# Patient Record
Sex: FEMALE | Race: WHITE | Employment: PART TIME | URBAN - METROPOLITAN AREA
[De-identification: names, ages, dates, MRNs, and addresses within clinical notes are randomized per-mention and may not be internally consistent; named-entity substitution may affect disease eponyms.]

---
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
## 2022-01-11 DIAGNOSIS — F41.1 GAD (GENERALIZED ANXIETY DISORDER): ICD-10-CM

## 2022-01-11 DIAGNOSIS — F39 MOOD DISORDER (H): ICD-10-CM

## 2022-01-12 NOTE — TELEPHONE ENCOUNTER
Medication requested: sertraline (ZOLOFT) 50 MG tablet  Last refilled: 9/12/21  Qty: 30/4      Last seen: 8/12/21  RTC: RTC upon return from college for Winter break. Meanwhile, establish care with on-campus psychiatrist  Cancel: 0  No-show: 0  Next appt: 0    Refill decision:   Refill pended and routed to the provider for review/determination due to   Unclear if pt has found a new psychiatrist on campus

## 2022-03-16 ENCOUNTER — TELEPHONE (OUTPATIENT)
Dept: PSYCHIATRY | Facility: CLINIC | Age: 22
End: 2022-03-16
Payer: COMMERCIAL

## 2022-03-16 DIAGNOSIS — F39 MOOD DISORDER (H): ICD-10-CM

## 2022-03-16 DIAGNOSIS — F41.1 GAD (GENERALIZED ANXIETY DISORDER): ICD-10-CM

## 2022-03-16 NOTE — TELEPHONE ENCOUNTER
[facility] Call Center    Phone Message    May a detailed message be left on voicemail: yes     Reason for Call: Medication Refill Request    Has the patient contacted the pharmacy for the refill? Yes   Name of medication being requested: lamoTRIgine (LAMICTAL) 100 MG tablet & sertraline (ZOLOFT) 50 MG tablet  Provider who prescribed the medication: Dr. Rand  Pharmacy: [id number] - [place], [place] - [address] AT [facility] [address] (Ph: [phone])  Date medication is needed: ASAP: 50mg of lamotrigine left and a couple of days of sertraline      Action Taken: Message routed to:  Other: P MIDB NURSE POOL    Travel Screening: Not Applicable

## 2022-03-16 NOTE — TELEPHONE ENCOUNTER
Last seen: 8/12/21  RTC: RTC upon return from [facility] for winter break  Cancel: none  No-show: none  Next appt: 5/13/22     Incoming refill from patient via telephone      Medication requested: sertraline (ZOLOFT) 50 MG tablet  Directions: Take 1 tablet (50 mg) by mouth daily  Qty: 30 tablet   Last refilled: 2/15/22     Medication requested: lamoTRIgine (LAMICTAL) 100 MG tablet  Directions: Take 1.5 tablets (150 mg) by mouth daily - Oral  Qty: 45 tablet   Last refilled: 12/31/21, should be one refill remaining      Reached out to patient via [employer] to confirm when if lamotrigine has been being taken consistently.

## 2022-03-17 RX ORDER — LAMOTRIGINE 100 MG/1
150 TABLET ORAL DAILY
Qty: 45 TABLET | Refills: 1 | Status: SHIPPED | OUTPATIENT
Start: 2022-03-17 | End: 2022-05-06

## 2022-03-17 NOTE — TELEPHONE ENCOUNTER
Placed call to patient who confirmed she has been taking medications consistently. Reports she has excess supply of lamotrigine which accounts for the lapse in refills.     Received TORB from provider to send enough refills until next appointment.

## 2022-05-06 ENCOUNTER — VIRTUAL VISIT (OUTPATIENT)
Dept: PSYCHIATRY | Facility: CLINIC | Age: 22
End: 2022-05-06
Payer: COMMERCIAL

## 2022-05-06 DIAGNOSIS — F41.1 GAD (GENERALIZED ANXIETY DISORDER): ICD-10-CM

## 2022-05-06 DIAGNOSIS — F39 MOOD DISORDER (H): ICD-10-CM

## 2022-05-06 PROCEDURE — 99214 OFFICE O/P EST MOD 30 MIN: CPT | Mod: GT | Performed by: PSYCHIATRY & NEUROLOGY

## 2022-05-06 RX ORDER — LAMOTRIGINE 100 MG/1
150 TABLET ORAL DAILY
Qty: 45 TABLET | Refills: 3 | Status: SHIPPED | OUTPATIENT
Start: 2022-05-06 | End: 2022-08-19

## 2022-05-06 NOTE — PROGRESS NOTES
"  Progress Note    Identification: Keri is a 21 year old woman with a history of persistent depressive disorder (versus bipolar 2). She was last seen in February at which time we made no medication changes.    Recent History:     Keri reports she has been feeling better since starting zoloft. Anxiety is more manageable. Feel overall less anxious, better control. Feels more motivated.   Goes for a walk in the morning, feels this helps her set up for the day ahead. More things to look forward to. No suicidal thoughts lately.     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 Planning to go to grad school in psychology after college.    Delaney reports she sometimes experiences \"sudden bursts of really intense energy\". She feels it is not iker, but feels out of control. Really really hyper, sudden and drops off quickly. Returns to base level but not depression. Lasts a few minutes. Feels happier and more social during these energy bursts. Typically during these experiences she will \"ramble\" at her roommate and speak faster than usual.    Medications:  Lamotrigine 150mg/day  Zoloft 50mg/day  Birth control pill    Mental Status Examination: Patient was pleasant, calm and cooperative. Normal speech. Affect is neutral to bright. Mood is reported as \"good\". Thought process is linear. Thought content without SI or HI currently, but did have SI a couple weeks ago. No evidence of psychosis. Insight and judgment are intact. Concentration and memory within normal limits. Intellectual functioning appears to be above average.    Diagnosis:  1. Persistent depressive disorder (rule out bipolar 2)  2. Generalized anxiety disorder with panic attacks  3. Rule out eating disorder  4. Rule out body dysmorphic disorder     Assessment: " Keri is a 21 year old woman with history of depression (with prior provider who started lamictal diagnosing Bipolar 2) and anxiety symptoms since childhood, worsening during middle school and high school. Recently completed DBT therapy. Stressors include academic stress, COVID-related isolation, and ongoing poor sense of self. WE recently added zoloft which has been very helpful for anxiety and depression symptoms. She does have some episodes of increased energy which may be a part of being on the bipolar spectrum but they are very transient (few minutes) and not impairing. Will continue for now.    Plan:   1. Continue current medications.  2. RTC prior to return to college    Maria Teresa Rand MD    30 minutes spent in face to face time with patient for this visit, more than half in counseling on management of mood and eating symptoms and academic issues.    Video- Visit Details  Type of service:  video visit for medication management  Time of service:    Date:  05/06/2022    Video Start Time:  2:3PM        Video End Time:  3PM    Reason for video visit:  Patient unable to travel due to Covid-19  Originating Site (patient location):  Home  Distant Site (provider location): [facility]  Mode of Communication:  Video Conference via [employer]  Consent:  Patient has given verbal consent for video visit?: Yes

## 2022-08-19 ENCOUNTER — OFFICE VISIT (OUTPATIENT)
Dept: PSYCHIATRY | Facility: CLINIC | Age: 22
End: 2022-08-19
Payer: COMMERCIAL

## 2022-08-19 VITALS
WEIGHT: 218.1 LBS | BODY MASS INDEX: 36.78 KG/M2 | HEART RATE: 98 BPM | SYSTOLIC BLOOD PRESSURE: 137 MMHG | DIASTOLIC BLOOD PRESSURE: 81 MMHG

## 2022-08-19 DIAGNOSIS — F39 MOOD DISORDER (H): ICD-10-CM

## 2022-08-19 DIAGNOSIS — F41.1 GAD (GENERALIZED ANXIETY DISORDER): ICD-10-CM

## 2022-08-19 PROCEDURE — 99215 OFFICE O/P EST HI 40 MIN: CPT | Performed by: PSYCHIATRY & NEUROLOGY

## 2022-08-19 RX ORDER — LAMOTRIGINE 100 MG/1
150 TABLET ORAL DAILY
Qty: 45 TABLET | Refills: 11 | Status: SHIPPED | OUTPATIENT
Start: 2022-08-19 | End: 2023-11-17

## 2022-08-19 NOTE — NURSING NOTE
Chief Complaint   Patient presents with     Recheck Medication       /81 (BP Location: Right arm, Patient Position: Sitting, Cuff Size: Adult Regular)   Pulse 98   Wt 98.9 kg (218 lb 1.6 oz)   BMI 36.78 kg/m      Medina Marcus  August 19, 2022

## 2022-08-19 NOTE — PATIENT INSTRUCTIONS
**For crisis resources, please see the information at the end of this document**   Patient Education    Thank you for coming to the [facility].    Lab Testing:  If you had lab testing today and your results are reassuring or normal they will be mailed to you or sent through [employer] within 7 days. If the lab tests need quick action we will call you with the results. The phone number we will call with results is # [phone] (home) . If this is not the best number please call our clinic and change the number.    Medication Refills:  If you need any refills please call your pharmacy and they will contact us. Our fax number for refills is [phone]. Please allow three business for refill processing. If you need to  your refill at a new pharmacy, please contact the new pharmacy directly. The new pharmacy will help you get your medications transferred.     Scheduling:  If you have any concerns about today's visit or wish to schedule another appointment please call our office during normal business hours [phone] (8-5:00 M-F)    Contact Us:  Please call [phone] during business hours (8-5:00 M-F).  If after clinic hours, or on the weekend, please call  [phone].    Financial Assistance [phone]  [facility] Billing [phone]  Central Billing Office, [facility]: [phone]  [place] Billing [phone]  Medical Records [phone]  [place] Patient Bill of Rights [email or web address]       MENTAL HEALTH CRISIS NUMBERS:  For a medical emergency please call  911 or go to the nearest ER.     [facility]:   [facility] -[phone]   Crisis Residence [facility]idence -[phone]   Walk-In Counseling Center [facility] [place] [facility] -[phone]   COPE 24/7 [facility] Mobile Team -[phone] (adults)/[phone] (child)  CHILD: [facility] needs assessment team - [phone]       [facility]:   [facility] - [phone]   Walk-in counseling [facility] - [phone]   Walk-in counseling [facility] - [phone]   Crisis Residence [facility] - [phone]  Urgent Care Adult Mental Ivlhfo-[phone] mobile unit/ 24/7 crisis line    National Crisis Numbers:   National Suicide Prevention Lifeline: 6-226-004-TALK (702-344-5337)  Poison Control Center - 1-748-724-7315  [employer]/resources for a list of additional resources (SOS)  Trans Lifeline a hotline for transgender people 2-072-808-2493  The [employer] Project a hotline for LGBT youth 1-937.112.2441  Crisis Text Line: For any crisis 24/7   To: 452882  see www.crisistextline.org  - IF MAKING A CALL FEELS TOO HARD, send a text!         Again thank you for choosing [facility] and please let us know how we can best partner with you to improve you and your family's health.    You may be receiving a survey regarding this appointment. We would love to have your feedback, both positive and negative. The survey is done by an external company, so your answers are anonymous.
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
## 2022-09-11 ENCOUNTER — HEALTH MAINTENANCE LETTER (OUTPATIENT)
Age: 22
End: 2022-09-11

## 2023-09-18 DIAGNOSIS — F39 MOOD DISORDER (H): ICD-10-CM

## 2023-09-18 DIAGNOSIS — F41.1 GAD (GENERALIZED ANXIETY DISORDER): ICD-10-CM

## 2023-09-18 RX ORDER — LAMOTRIGINE 100 MG/1
150 TABLET ORAL DAILY
Qty: 45 TABLET | Refills: 0 | Status: CANCELLED | OUTPATIENT
Start: 2023-09-18

## 2023-09-18 NOTE — TELEPHONE ENCOUNTER
"Refill request received from: pharmacy    Last appointment: 8/19/2022    RTC: winter break    Canceled appointments: 0    No Showed appointments: 0    Follow up scheduled: 0    Requested medication(s) (copy and paste last order information):     Disp Refills Start End RAGINI    sertraline (ZOLOFT) 50 MG tablet 30 tablet 11 8/19/2022  No   Sig - Route: Take 1 tablet (50 mg) by mouth daily - Oral   Sent to pharmacy as: Sertraline HCl 50 MG Oral Tablet (ZOLOFT)   Class: E-Prescribe   Order: [id number]   E-Prescribing Status: Receipt confirmed by pharmacy (8/19/2022  4:59 PM CDT)       Disp Refills Start End RAGINI    lamoTRIgine (LAMICTAL) 100 MG tablet 45 tablet 11 8/19/2022  No   Sig - Route: Take 1.5 tablets (150 mg) by mouth daily - Oral   Sent to pharmacy as: lamoTRIgine 100 MG Oral Tablet (LaMICtal)   Class: E-Prescribe   Order: [id number]   E-Prescribing Status: Receipt confirmed by pharmacy (8/19/2022  4:59 PM CDT)          Date medication last filled per outside med information: 7/25/2023 for 30 d/s    Months of medication pended per MIDB refill protocol: 1    Request was sent to RNCC Pool for approval    If patient is due for follow up \"Appointment required for further refills [phone]\" was placed in the sig of the medication and encounter was routed to scheduling pool to encourage follow up.     Medication pended by: Mariajose Berg CMA    "

## 2023-09-18 NOTE — TELEPHONE ENCOUNTER
Reached out to patient via [employer] to confirm when each medication was last taken and to notify that an appointment is required for further refills.

## 2023-09-20 NOTE — TELEPHONE ENCOUNTER
Called patient and scheduled as new patient on 11/17 as they have not been seen in over a year. Patient stated that the last time she took the medication was on 9/17/2023. I encouraged the patient to look into their mychart to respond to the care team's message. I scheduled the patient in 3 return slots as they are only in [place] 11/17-11/25. A message to the provider was also sent to confirm scheduling.

## 2023-09-22 NOTE — TELEPHONE ENCOUNTER
Maria Teresa Rand MD  to [facility]    9/22/23  3:58 PM  Hi Delia, we can re-start at 25mg/day for 2 weeks, then 50mg/day for 2 weeks, then 100mg/day. Maybe she can stay there until our next visit.   Thank you!        Follow up:   Placed call to patient to discuss titration plan before sending prescription. No answer. LVM requesting call back before script is sent.

## 2023-09-25 RX ORDER — LAMOTRIGINE 25 MG/1
TABLET ORAL
Qty: 42 TABLET | Refills: 0 | Status: SHIPPED | OUTPATIENT
Start: 2023-09-25 | End: 2024-04-19

## 2023-09-25 RX ORDER — LAMOTRIGINE 100 MG/1
100 TABLET ORAL DAILY
Qty: 30 TABLET | Refills: 0 | Status: SHIPPED | OUTPATIENT
Start: 2023-10-20 | End: 2024-04-19

## 2023-09-25 NOTE — TELEPHONE ENCOUNTER
Incoming call from patient. Writer explained titration plan for lamotrigine, patient verbalized understanding.     Received TORB from provider to send refills (and refills of sertraline 50 mg) to match upcoming appointment date.

## 2023-11-07 DIAGNOSIS — F39 MOOD DISORDER (H): ICD-10-CM

## 2023-11-07 RX ORDER — LAMOTRIGINE 100 MG/1
100 TABLET ORAL DAILY
Qty: 30 TABLET | Refills: 0 | OUTPATIENT
Start: 2023-11-07

## 2023-11-07 NOTE — TELEPHONE ENCOUNTER
Per chart review, refill filled on for this medication but patient was not due to start 100mg dose until 10/20, so patient should have enough medication to get to follow-up scheduled on 11/17..  Called patient LVM in case refill history is incorrect and patient will run out of medication prior to next appointment.

## 2023-11-07 NOTE — TELEPHONE ENCOUNTER
"Refill request received from: Pharm     Last appointment: 08/19/22    RTC: winter break     Canceled appointments: None    No Showed appointments: None    Follow up scheduled: 11/17    Requested medication(s) (copy and paste last order information):   Disp Refills Start End RAGINI    lamoTRIgine (LAMICTAL) 100 MG tablet 30 tablet 0 10/20/2023  --   Sig - Route: Take 1 tablet (100 mg) by mouth daily (to be started after completion of 50 mg daily for two weeks) - Oral   Sent to pharmacy as: lamoTRIgine 100 MG Oral Tablet (LaMICtal)   Class: E-Prescribe   Order: [id number]   E-Prescribing Status: Receipt confirmed by pharmacy (9/25/2023  3:52 PM CDT)       Date medication last filled per outside med information: 09/28     Amount medication last filled for (d/s  or quantity): 30 d/s    Months of medication pended per MIDB refill protocol: 1 month     Request was sent to Maria Teresa Rand for approval    If patient is due for follow up \"Appointment required for further refills [phone]\" was placed in the sig of the medication and encounter was routed to scheduling pool to encourage follow up.     "

## 2023-11-17 ENCOUNTER — VIRTUAL VISIT (OUTPATIENT)
Dept: PSYCHIATRY | Facility: CLINIC | Age: 23
End: 2023-11-17
Payer: COMMERCIAL

## 2023-11-17 DIAGNOSIS — F39 MOOD DISORDER (H): ICD-10-CM

## 2023-11-17 PROCEDURE — 99214 OFFICE O/P EST MOD 30 MIN: CPT | Mod: VID | Performed by: PSYCHIATRY & NEUROLOGY

## 2023-11-17 PROCEDURE — 90833 PSYTX W PT W E/M 30 MIN: CPT | Mod: VID | Performed by: PSYCHIATRY & NEUROLOGY

## 2023-11-17 RX ORDER — LAMOTRIGINE 100 MG/1
150 TABLET ORAL DAILY
Qty: 45 TABLET | Refills: 6 | Status: SHIPPED | OUTPATIENT
Start: 2023-11-17 | End: 2024-07-29

## 2023-11-17 ASSESSMENT — PAIN SCALES - GENERAL: PAINLEVEL: NO PAIN (0)

## 2023-11-17 NOTE — NURSING NOTE
Is the patient currently in the state of [place]? YES    Visit mode:VIDEO    If the visit is dropped, the patient can be reconnected by: VIDEO VISIT: Send to e-mail at: [email or web address]    Will anyone else be joining the visit? NO  (If patient encounters technical issues they should call [phone]    How would you like to obtain your AVS? MyChart    Are changes needed to the allergy or medication list? No  Patient denies any changes since echeck-in regarding medication and allergies and states all information entered during echeck-in remains accurate.     Reason for visit: Consult    Jo KING

## 2023-11-17 NOTE — PROGRESS NOTES
"Virtual Visit Details    Type of service:  Video Visit     Originating Location (pt. Location): Home    Distant Location (provider location):  On-site  Platform used for Video Visit: [employer]      Progress Note    Identification: Keri is a 23 year old woman with a history of persistent depressive disorder (versus bipolar 2). She was last seen in August 2022 at which time we made no medication changes.    Recent History: Due to a lapse in visits (not seen for over a year), Keri had a hard time getting her medications refilled. She had a lapse in her lamotrigine in September, and had to re-start at 25mg/day and taper back up per usual protocol. Now back on previous dose of 150mg/day.    Has noticed hypmania more, especially when she forgets her medications. Also notices depression more when not taking meds.  Struggles to remember medications regularly. This comes and goes, like when she is more stressed with school, more likely to forget. Sometimes will forget 1-2 days in a row. Things are better when she remembers to take her medications. Sometimes forgets to eat and then can't take medications.    \"When I take the medication consistently every single day I feel good, I feel regulated.\"    Keri described her hypomania episodes as follows: excitable, fast talking, energetic, happy \"going for it\", distracted easily. Usually a day or two. Generally experiences a dip in mood afterwards. Generally has better self esteem during these episodes, but no grandiosity.    Keri described her depression episodes as follows: crying, super low energy, very negative on self. No recent suicidal ideation (only occasional thoughts of \"I don't want to feel this way\").  Occasional self harm thoughts but they are easily overcome. Usually last 1-4 weeks (more commonly closer to a week). Most recently late August / early September.    Sleep has been erratic. Procrastinates on sleep, stays up late doing homework.    Keri reports she will " "be graduating from school this December. Excited to come home.   Planning to intern at a hospital in [place] on a psych jasso. Planning to take next semester and next academic year off, then pursue a graduate degree in psychology.      Medications:  Lamotrigine 150mg/day  Zoloft 50mg/day  Birth control pill    Mental Status Examination: Patient was pleasant, calm and cooperative. Normal speech. Affect is neutral to bright. Mood is reported as \"good\". Thought process is linear. Thought content without SI or HI currently. No evidence of psychosis. Insight and judgment are intact. Concentration and memory within normal limits. Intellectual functioning appears to be above average.    Diagnosis:  1. Persistent depressive disorder (rule out bipolar 2)  2. Generalized anxiety disorder with panic attacks  3. Rule out eating disorder  4. Rule out body dysmorphic disorder     Assessment: Keri is a 21 year old woman with history of depression and anxiety symptoms since childhood, worsening during middle school and high school. Completed DBT therapy last year. She did have a diagnosis of Bipolar 2 during high school that was later questioned because of lack of a clear history of iker. She has been on lamotrigine since the time of that initial bipolar 2 diagnosis however, and has benefited from that medication with respective to mood. Stressors include academic stress, COVID-related isolation, and ongoing poor sense of self. We added zoloft 1-2 years ago which has been very helpful for anxiety and depression symptoms. Since then she has had transient (initially just a few minutes, lately about 1 day, but never 4 days) episodes of elevated energy, rapid speech, higher self-esteem (but not grandiosity) which have not been impairing. Compliance is worse during stressful periods at school. She does feel that when she takes both medications regularly, her mood is well-regulated.    Plan:   1. Continue current medications. 6 " refills  2. RTC next summer    Maria Teresa Rand MD      Psychiatry Individual Psychotherapy Note   Psychotherapy start time - 1:40  Psychotherapy end time - 2:00  Date treatment plan last reviewed with patient - 11/17/23  Subjective: This supportive psychotherapy session addressed issues related to goals of therapy and current psychosocial stressors. Patient's reaction: Maintenance in the context of mental status appropriate for ambulatory setting.    Interactive complexity indicated? No  Plan: RTC in timeframe noted above  Psychotherapy services during this visit included myself and the patient.   Treatment Plan      SYMPTOMS; PROBLEMS   MEASURABLE GOALS;    FUNCTIONAL IMPROVEMENT / GAINS INTERVENTIONS DISCHARGE CRITERIA   Depression: depressed mood and low energy  Radha/Hypomania: increased energy   reduce depressive episodes, report feeling more positive about self , and reduce manic/hypomanic episodes Supportive / psychodynamic marked symptom improvement

## 2023-11-20 ENCOUNTER — TRANSFERRED RECORDS (OUTPATIENT)
Dept: HEALTH INFORMATION MANAGEMENT | Facility: CLINIC | Age: 23
End: 2023-11-20

## 2023-12-03 DIAGNOSIS — F41.1 GAD (GENERALIZED ANXIETY DISORDER): ICD-10-CM

## 2023-12-03 DIAGNOSIS — F39 MOOD DISORDER (H): ICD-10-CM

## 2023-12-06 NOTE — TELEPHONE ENCOUNTER
Medication requested: sertraline (ZOLOFT) 50 MG tablet   Last refilled: 9/25/23  Qty: 30/1      Last seen: 11/17/23  RTC: next summer  Cancel: 0  No-show: 0  Next appt: 0    Refill decision:   Refill pended and routed to the provider for review/determination due to   Last notes plan..  Plan:   1. Continue current medications. 6 refills  2. RTC next summer     Ok to refill x 6

## 2024-02-18 ENCOUNTER — HEALTH MAINTENANCE LETTER (OUTPATIENT)
Age: 24
End: 2024-02-18

## 2024-04-18 ASSESSMENT — PATIENT HEALTH QUESTIONNAIRE - PHQ9
SUM OF ALL RESPONSES TO PHQ QUESTIONS 1-9: 7
SUM OF ALL RESPONSES TO PHQ QUESTIONS 1-9: 7
10. IF YOU CHECKED OFF ANY PROBLEMS, HOW DIFFICULT HAVE THESE PROBLEMS MADE IT FOR YOU TO DO YOUR WORK, TAKE CARE OF THINGS AT HOME, OR GET ALONG WITH OTHER PEOPLE: SOMEWHAT DIFFICULT

## 2024-04-19 ENCOUNTER — OFFICE VISIT (OUTPATIENT)
Dept: FAMILY MEDICINE | Facility: CLINIC | Age: 24
End: 2024-04-19
Payer: COMMERCIAL

## 2024-04-19 VITALS
TEMPERATURE: 98.3 F | BODY MASS INDEX: 35.89 KG/M2 | HEART RATE: 90 BPM | HEIGHT: 64 IN | WEIGHT: 210.2 LBS | DIASTOLIC BLOOD PRESSURE: 70 MMHG | OXYGEN SATURATION: 97 % | SYSTOLIC BLOOD PRESSURE: 128 MMHG | RESPIRATION RATE: 20 BRPM

## 2024-04-19 DIAGNOSIS — E66.812 CLASS 2 OBESITY WITHOUT SERIOUS COMORBIDITY WITH BODY MASS INDEX (BMI) OF 36.0 TO 36.9 IN ADULT, UNSPECIFIED OBESITY TYPE: ICD-10-CM

## 2024-04-19 DIAGNOSIS — J45.30 MILD PERSISTENT ASTHMA WITHOUT COMPLICATION: ICD-10-CM

## 2024-04-19 DIAGNOSIS — Z00.00 ENCOUNTER FOR MEDICAL EXAMINATION TO ESTABLISH CARE: Primary | ICD-10-CM

## 2024-04-19 DIAGNOSIS — Z30.41 SURVEILLANCE OF PREVIOUSLY PRESCRIBED CONTRACEPTIVE PILL: ICD-10-CM

## 2024-04-19 PROBLEM — F33.0 MILD EPISODE OF RECURRENT MAJOR DEPRESSIVE DISORDER (H): Status: ACTIVE | Noted: 2019-08-26

## 2024-04-19 PROCEDURE — 99203 OFFICE O/P NEW LOW 30 MIN: CPT | Performed by: PHYSICIAN ASSISTANT

## 2024-04-19 RX ORDER — BECLOMETHASONE DIPROPIONATE HFA 80 UG/1
AEROSOL, METERED RESPIRATORY (INHALATION)
COMMUNITY
Start: 2023-11-06 | End: 2024-04-19

## 2024-04-19 RX ORDER — ALBUTEROL SULFATE 90 UG/1
2 AEROSOL, METERED RESPIRATORY (INHALATION)
COMMUNITY
End: 2024-09-11

## 2024-04-19 RX ORDER — BECLOMETHASONE DIPROPIONATE HFA 80 UG/1
1 AEROSOL, METERED RESPIRATORY (INHALATION) 2 TIMES DAILY
Qty: 10.6 G | Refills: 11 | Status: SHIPPED | OUTPATIENT
Start: 2024-04-19 | End: 2024-09-11

## 2024-04-19 RX ORDER — NORGESTIMATE AND ETHINYL ESTRADIOL 0.25-0.035
1 KIT ORAL DAILY
Qty: 84 TABLET | Refills: 4 | Status: SHIPPED | OUTPATIENT
Start: 2024-04-19 | End: 2024-09-11

## 2024-04-19 ASSESSMENT — PAIN SCALES - GENERAL: PAINLEVEL: NO PAIN (0)

## 2024-04-19 ASSESSMENT — ASTHMA QUESTIONNAIRES: ACT_TOTALSCORE: 21

## 2024-04-19 NOTE — PROGRESS NOTES
"  Assessment & Plan     Encounter for medical examination to establish care    Mild persistent asthma without complication - well controlled, refills provided  - QVAR REDIHALER 80 MCG/ACT inhaler  Dispense: 10.6 g; Refill: 11    Surveillance of previously prescribed contraceptive pill - refills sent  - norgestimate-ethinyl estradiol (ORTHO-CYCLEN) 0.25-35 MG-MCG tablet  Dispense: 84 tablet; Refill: 4    Class 2 obesity without serious comorbidity with body mass index (BMI) of 36.0 to 36.9 in adult, unspecified obesity type - referral provided as requested  - Nutrition Referral      BMI  Estimated body mass index is 36.08 kg/m  as calculated from the following:    Height as of this encounter: 1.626 m (5' 4\").    Weight as of this encounter: 95.3 kg (210 lb 3.2 oz).           Beverly Saavedra is a 23 year old, presenting for the following health issues:  Establish Care (/Referral for Dietician /RX for QVAR REDIHALER 80MCG ORALINH (120) N, need reconciled and refilled. )      4/19/2024     2:20 PM   Additional Questions   Roomed by YULY Granados   Accompanied by Yaniv Saavedra here today to establish care, M    Would liek to work with nutritionist/dietician to work on weight management  Required by insurance    Graduated from [employer] with degree in psychology  Ultimately interested in PsyD    History of Present Illness       Reason for visit:  Finding a new primary care doctor, referral for a dietitianShe consumes 0 sweetened beverage(s) daily.   She is taking medications regularly.       Objective    /70 (BP Location: Right arm, Patient Position: Sitting, Cuff Size: Adult Regular)   Pulse 90   Temp 98.3  F (36.8  C) (Temporal)   Resp 20   Ht 1.626 m (5' 4\")   Wt 95.3 kg (210 lb 3.2 oz)   LMP 03/29/2024 (Approximate)   SpO2 97%   BMI 36.08 kg/m    Body mass index is 36.08 kg/m .  Physical Exam   GENERAL: alert and no distress  PSYCH: mentation appears normal, affect normal/bright          Signed " Electronically by: Ruthie Sheridan PA-C

## 2024-06-11 DIAGNOSIS — F41.1 GAD (GENERALIZED ANXIETY DISORDER): ICD-10-CM

## 2024-06-11 NOTE — TELEPHONE ENCOUNTER
Date of Last Office Visit: 11/17/23 - Christina  RTC:  RTC next summer   No shows: 0  Cancellations: 0  Date of Next Office Visit: 0  ------------------------------    Incoming refill from Pharmacy via interface  Medication requested:    sertraline (ZOLOFT) 50 MG tablet 30 tablet 5 12/6/2023 -- No   Sig - Route: Take 1 tablet (50 mg) by mouth daily     ------------------------------         Refill decision: Refill pended and routed to the provider for review/determination due to the following criteria not met: Due for RTC, needs appointment scheduled for refills.     Scheduling: Please contact the pt for appointment, thanks!      Medication unable to be refilled by RN due to criteria not met as indicated.                 []Eligibility - not seen in the last year              [x]Supervision - no future appointment              []Compliance - no shows, cancellations or lapse in therapy              []Verification - order discrepancy              []Controlled medication              []Medication not included in policy              []90-day supply request              []Other:

## 2024-07-15 NOTE — PROGRESS NOTES
"PSYCHOTHERAPY NOTE  TELE-HEALTH VISIT    DATE OF SESSION: Aug 2, 2021  SESSION TYPE: Individual Therapy  PARTICIPANTS: Abigail \"Keri\" (client), Sandra (clinician)  LENGTH OF SESSION: 5:02-6:06 (64 minutes)    Video- Visit Details  Reason for video visit:  Services only offered telehealth  Originating Site (patient location):  [facility]   Location- Patient's home  Distant Site (provider location):  [facility]  Mode of Communication:  Video Conference via [email or web address]  Consent:  Patient has given verbal consent for video visit?: Yes     NAME: Abigail \"Keir\" Clarence Tran  PRONOUNS: She/Her  :  2000 (21 year old)  MRN: [id number]  DIAGNOSIS:   Encounter Diagnoses   Name Primary?     Persistent depressive disorder, current major depressive episode Yes     LETICIA (generalized anxiety disorder), with panic attacks          Subjective/Objective   Keri reported she's \"pretty good\" today but described having \"a pretty bad\" panic attack last Saturday following a conflict with her mother on Friday. She also described some very skillful interpersonal interactions with her mother following this. She reported that her panic attack was prompted by overeating and feeling excessive guilt and shame about this. She stated, \"My mood has been a little low since then\" with increased fatigue. \"I'm afraid I'll never get better.\"      Treatment    Normalized her mood based on current life context, and offered empathy. Discussed the negative thoughts she's struggling with about her self worth. Keri had an ah-sandoval moment: felt unhappy for a long time therefore gets self hyped up to feel good and happy when with other people. Fear of falling into depression hole dictates how she shows up with others- avoidance of depression? Checked the facts of her mood together, noticing the ebbs and flows she's experienced and validating the pain and suffering that comes with mental illness.    Assessment   Keri arrived on time and fully " "participated in today's session. She was Cooperative and Engaged and made good eye contact. Her speech was normal rate, rhythm, and tone, and thought process was Coherent  Logical . Keri's self reported mood was \"pretty good; low\" and affect appropriate to speech content.      Plan   Keri will return in 1 week to continue working on treatment goals. She will return to college on 8/20.     Treatment Plan Reviewed: 6/15/21    CLINICIAN: Sandra Diaz MA, [facility]  CLINICIAN SUPERVISOR: Terrie Montero, PhD, LP, LMFT  " I have reviewed and confirmed nurses' notes...

## 2024-07-19 DIAGNOSIS — F41.1 GAD (GENERALIZED ANXIETY DISORDER): ICD-10-CM

## 2024-07-20 NOTE — TELEPHONE ENCOUNTER
Medication requested:   sertraline (ZOLOFT) 50 MG tablet 30 tablet 0 6/14/2024       Last seen: 11/17/23  RTC: summer  Cancel: 0  No-show: 0  Next appt: 0    Refill decision:   14 day debra refill sent to the pharmacy - including instructions for patient to call the clinic and schedule an appointment.

## 2024-07-29 ENCOUNTER — HOSPITAL ENCOUNTER (EMERGENCY)
Facility: CLINIC | Age: 24
Discharge: HOME | End: 2024-07-29
Admitting: PHYSICIAN ASSISTANT
Payer: COMMERCIAL

## 2024-07-29 VITALS
DIASTOLIC BLOOD PRESSURE: 85 MMHG | WEIGHT: 211.8 LBS | BODY MASS INDEX: 36.16 KG/M2 | TEMPERATURE: 97.9 F | OXYGEN SATURATION: 96 % | SYSTOLIC BLOOD PRESSURE: 123 MMHG | HEIGHT: 64 IN | HEART RATE: 101 BPM | RESPIRATION RATE: 16 BRPM

## 2024-07-29 DIAGNOSIS — F39 MOOD DISORDER (H): ICD-10-CM

## 2024-07-29 DIAGNOSIS — Z76.0 ENCOUNTER FOR MEDICATION REFILL: ICD-10-CM

## 2024-07-29 PROCEDURE — 99284 EMERGENCY DEPT VISIT MOD MDM: CPT | Performed by: PHYSICIAN ASSISTANT

## 2024-07-29 PROCEDURE — 99283 EMERGENCY DEPT VISIT LOW MDM: CPT | Performed by: PHYSICIAN ASSISTANT

## 2024-07-29 RX ORDER — LAMOTRIGINE 100 MG/1
150 TABLET ORAL DAILY
Qty: 21 TABLET | Refills: 0 | Status: SHIPPED | OUTPATIENT
Start: 2024-07-29 | End: 2024-08-12

## 2024-07-29 RX ORDER — LAMOTRIGINE 100 MG/1
150 TABLET ORAL DAILY
Qty: 45 TABLET | Refills: 0 | Status: SHIPPED | OUTPATIENT
Start: 2024-07-29 | End: 2024-08-27

## 2024-07-29 ASSESSMENT — COLUMBIA-SUICIDE SEVERITY RATING SCALE - C-SSRS
1. IN THE PAST MONTH, HAVE YOU WISHED YOU WERE DEAD OR WISHED YOU COULD GO TO SLEEP AND NOT WAKE UP?: NO
6. HAVE YOU EVER DONE ANYTHING, STARTED TO DO ANYTHING, OR PREPARED TO DO ANYTHING TO END YOUR LIFE?: NO
2. HAVE YOU ACTUALLY HAD ANY THOUGHTS OF KILLING YOURSELF IN THE PAST MONTH?: NO

## 2024-07-29 ASSESSMENT — ACTIVITIES OF DAILY LIVING (ADL): ADLS_ACUITY_SCORE: 33

## 2024-07-29 NOTE — ED PROVIDER NOTES
ED Provider Note  [facility]      History     Chief Complaint   Patient presents with    Medication Refill     HPI  Abigail Tran is a 24 year old female past medical history significant for general anxiety disorder, depressive disorder who presents to the emergency department requesting medication refill.  She is here today with her mom.  She states she has been taking lamotrigine 150 mg daily and recently ran out of the med.  Per chart review it was filled most recently June 12 and patient states that she did have some additional home meds that she was taking up until Friday afternoon which was her last dose.  She is going out of town tomorrow for a trip returning later this week and is hoping to have the prescription refilled.  She is also on Zoloft and has been taking this as prescribed does not need a refill.  She denies any acute mental hearth concerns at this time including any SI HI or any safety concerns.  She has no other medical concerns.  She is adamant that her last dose was Friday of the lamotrigine.    Past Medical History  Past Medical History:   Diagnosis Date    Asthma     exercise    Depressive disorder     NO ACTIVE PROBLEMS (aka NONE)      History reviewed. No pertinent surgical history.  albuterol (PROAIR HFA/PROVENTIL HFA/VENTOLIN HFA) 108 (90 Base) MCG/ACT inhaler  lamoTRIgine (LAMICTAL) 100 MG tablet  lamoTRIgine (LAMICTAL) 100 MG tablet  norgestimate-ethinyl estradiol (ORTHO-CYCLEN) 0.25-35 MG-MCG tablet  QVAR REDIHALER 80 MCG/ACT inhaler  sertraline (ZOLOFT) 50 MG tablet      No Known Allergies  Family History  Family History   Problem Relation Age of Onset    Depression Mother     Substance Abuse Mother         Long term recovery for alcoholism    Osteoporosis Mother         Osteopenia    Other Cancer Father         Ocular melanoma    Asthma Father     Other Cancer Maternal Grandfather         Skin cancer    Hypertension Maternal Grandmother      "Osteoporosis Maternal Grandmother     Thyroid Disease Maternal Grandmother      Social History   Social History     Tobacco Use    Smoking status: Passive Smoke Exposure - Never Smoker    Smokeless tobacco: Never    Tobacco comments:     Vaped as well   Vaping Use    Vaping status: Former   Substance Use Topics    Alcohol use: Yes     Comment: Occasional glass of wine    Drug use: Not Currently     Types: Marijuana     Comment: VERY infrequently      A medically appropriate review of systems was performed with pertinent positives and negatives noted in the HPI, and all other systems negative.    Physical Exam   BP: 123/85  Pulse: 101  Temp: 97.9  F (36.6  C)  Resp: 16  Height: 162.6 cm (5' 4\")  Weight: 96.1 kg (211 lb 12.8 oz)  SpO2: 96 %  Physical Exam  GENERAL APPEARANCE: The patient is well developed, well appearing, and in no acute distress.  HEAD:  Normocephalic and atraumatic.   EENT: Voice normal.  NECK: Trachea is midline.No lymphadenopathy or tenderness.  LUNGS: Breath sounds are equal and clear bilaterally. No wheezes, rhonchi, or rales.  HEART: Regular rate and normal rhythm.    EXTREMITIES: No cyanosis, clubbing, or edema.  NEUROLOGIC: No focal sensory or motor deficits are noted.  PSYCHIATRIC: The patient is awake, alert.  Appropriate mood and affect.  SKIN: Warm, dry, and well perfused. Good turgor.      ED Course, Procedures, & Data           No results found for any visits on 07/29/24.  Medications - No data to display  Labs Ordered and Resulted from Time of ED Arrival to Time of ED Departure - No data to display  No orders to display          Critical care was not performed.     Medical Decision Making  The patient's presentation was of straightforward complexity (a clearly self-limited or minor problem).    The patient's evaluation involved:  review of external note(s) from 1 sources (see separate area of note for details)  discussion of management or test interpretation with another health " professional (Pharmacy)    The patient's management necessitated moderate risk (prescription drug management including medications given in the ED).    Assessment & Plan    This is a 24-year-old female presenting requesting med refill of her lamotrigine reportedly last dose  3 days ago within the 5-day window.  On presentation she has reassuring vitals, mildly tachycardic however is very anxious here in the ER.  She reports no acute psychiatric concerns at this time.  Did speak with pharmacy and as patient is within 5 days she does not need a taper.  I see also her psychiatrist has pended a refill prescription likely will fill this in the next few days as it is in their mailbox.  Patient is set to see psychiatry at the end of August.  I am comfortable providing patient with a short supply of her home dose 150 mg daily which was sent to a 24-hour pharmacy.  Patient and her mom will be picking this up tonight.  Patient was made aware that the ER is a safe place if she ever does have any acute medical concerns she may return seeking care.  Patient has no other questions or concerns at this time.  Red flag signs were addressed, and they were in agreement with the patient care plan provided.    I have reviewed the nursing notes. I have reviewed the findings, diagnosis, plan and need for follow up with the patient.    New Prescriptions    LAMOTRIGINE (LAMICTAL) 100 MG TABLET    Take 1.5 tablets (150 mg) by mouth daily for 14 days       Final diagnoses:   Encounter for medication refill       NARESH Brito [facility] EMERGENCY DEPARTMENT  7/29/2024     Delia Rivero PA-C  07/29/24 1917

## 2024-07-29 NOTE — TELEPHONE ENCOUNTER
Date of Last Office Visit: 11/17/2023  [facility]  RTC: RTC next summer   No shows: 0  Cancellations: 0  Date of Next Office Visit:    8/30/2024 1:30 PM (30 min)  [place]   Arrive by:  1:15 PM   [facility]AT[facility]    [facility]   Maria Teresa Rand MD     ------------------------------    Incoming refill from Pharmacy via interface  Medication requested:    lamoTRIgine (LAMICTAL) 100 MG tablet 45 tablet 6 11/17/2023 -- No   Sig - Route: Take 1.5 tablets (150 mg) by mouth daily - Oral   Last refill: 6/11/2024   ------------------------------      Refill decision: Refill pended and routed to the provider for review/determination due to the following criteria not met: Pended 30d, due to return for follow-up end of August - last refilled 6/11, possible lapse in adherence    Medication unable to be refilled by RN due to criteria not met as indicated.                 []Eligibility - not seen in the last year              []Supervision - no future appointment              [x]Compliance - no shows, cancellations or lapse in therapy              []Verification - order discrepancy              []Controlled medication              []Medication not included in policy              []90-day supply request              []Other:

## 2024-07-29 NOTE — DISCHARGE INSTRUCTIONS
I did send 14 days of the lamotrigine to your preferred pharmacy.  You should start this again tonight.  The prescription from your psychiatrist is pending and it looks like they will shortly be refilling 30-day supply of lamotrigine going forward.    If you develop any new or worsening symptoms, is important to return right away to the emergency department for further evaluation and management.

## 2024-07-29 NOTE — ED TRIAGE NOTES
Here for refill on Lamotrigine 100 mg 1 1/2 tabs daily.  No other complaints     Triage Assessment (Adult)       Row Name 07/29/24 4572          Triage Assessment    Airway WDL WDL        Respiratory WDL    Respiratory WDL WDL        Skin Circulation/Temperature WDL    Skin Circulation/Temperature WDL WDL        Cardiac WDL    Cardiac WDL WDL        Peripheral/Neurovascular WDL    Peripheral Neurovascular WDL WDL        Cognitive/Neuro/Behavioral WDL    Cognitive/Neuro/Behavioral WDL WDL

## 2024-07-30 ENCOUNTER — TELEPHONE (OUTPATIENT)
Dept: PSYCHIATRY | Facility: CLINIC | Age: 24
End: 2024-07-30

## 2024-07-30 ENCOUNTER — PATIENT OUTREACH (OUTPATIENT)
Dept: FAMILY MEDICINE | Facility: CLINIC | Age: 24
End: 2024-07-30

## 2024-07-30 NOTE — TELEPHONE ENCOUNTER
----- Message from Rachna RODRIGUEZ sent at 7/29/2024  4:34 PM CDT -----  Regarding: Med refill- ASAP  Hello,    So I got a call from this patient and she is requesting a refill of lamoTRIgine (LAMICTAL) 100 MG tablet to be sent to [id number] - [place], [place] - [address] AT [facility] [address]. She said that she didn't realize she was out and she is going out of town tomorrow so yeah....    THANK YOU!    Mary

## 2024-07-30 NOTE — TELEPHONE ENCOUNTER
Per chart review, patient presented to ED yesterday, 7/29, to request medication refill. She was provided a 14 d/s. 30 d/s sent yesterday by Dr. Rand as well. Patient notified via [employer].

## 2024-07-30 NOTE — TELEPHONE ENCOUNTER
Transitions of Care Outreach  Chief Complaint   Patient presents with    ER F/U       Most Recent Admission Date: 7/29/2024   Most Recent Admission Diagnosis:      Most Recent Discharge Date: 7/29/2024   Most Recent Discharge Diagnosis: Encounter for medication refill - Z76.0     Transitions of Care Assessment    Discharge Assessment  How are your symptoms? (Red Flag symptoms escalate to triage hotline per guidelines): Other (N/A)  Do you know how to contact your clinic care team if you have future questions or changes to your health status? : Yes  Does the patient have their discharge instructions? : Yes  Does the patient have questions regarding their discharge instructions? : No  Were you started on any new medications or were there changes to any of your previous medications? : No  Does the patient have all of their medications?: Yes  Do you have questions regarding any of your medications? : No    Follow up Plan     Discharge Follow-Up  Discharge follow up appointment scheduled in alignment with recommended follow up timeframe or Transitions of Risk Category? (Low = within 30 days; Moderate= within 14 days; High= within 7 days): No (no PCP follow up needed, pt has psych appt scheduled for August)  Patient's follow up appointment not scheduled: Patient declined scheduling support. Education on the importance of transitions of care follow up. Provided scheduling phone number.    Future Appointments   Date Time Provider Department Center   8/30/2024  1:30 PM Maria Teresa Rand MD [facility]   9/11/2024 12:50 PM Ruthie Sheridan PA-C [facility]       Outpatient Plan as outlined on AVS reviewed with patient.    For any urgent concerns, please contact our 24 hour nurse triage line: [phone] ([phone])       Von Del Valle RN

## 2024-08-07 ENCOUNTER — MYC REFILL (OUTPATIENT)
Dept: PSYCHIATRY | Facility: CLINIC | Age: 24
End: 2024-08-07
Payer: COMMERCIAL

## 2024-08-07 DIAGNOSIS — F41.1 GAD (GENERALIZED ANXIETY DISORDER): ICD-10-CM

## 2024-08-08 NOTE — TELEPHONE ENCOUNTER
Last seen: 11/17  RTC: summer  Cancel: none  No-show: none  Next appt: 8/30     Incoming refill from patient via MyChart      sertraline (ZOLOFT) 50 MG tablet 14 tablet 0 7/19/2024 -- No   Sig - Route: Take 1 tablet (50 mg) by mouth daily For more refills,schedule an appointment at [phone] - Oral   Last refilled: 7/24 for 14 d/s     Per most recent visit note:  Zoloft 50mg/day     Medication refill approved per refill protocol

## 2024-08-27 DIAGNOSIS — F39 MOOD DISORDER (H): ICD-10-CM

## 2024-08-27 RX ORDER — LAMOTRIGINE 100 MG/1
150 TABLET ORAL DAILY
Qty: 45 TABLET | Refills: 0 | Status: SHIPPED | OUTPATIENT
Start: 2024-08-27 | End: 2024-08-30

## 2024-08-27 NOTE — TELEPHONE ENCOUNTER
"Date of Last Office Visit: 11/17/2023  [facility]      RTC: summer 2024  No shows: 0  Cancellations: 0  Date of Next Office Visit:    8/30/2024 1:30 PM (30 min)  [place]   Arrive by:  1:15 PM   [facility]IATRY [facility]    [facility]   Maria Teresa Rand MD     ------------------------------    Medication requested:    lamoTRIgine (LAMICTAL) 100 MG tablet 45 tablet 0 7/29/2024 -- No   Sig - Route: Take 1.5 tablets (150 mg) by mouth daily - Oral     ------------------------------  From last visit note:   \" Continue current medications. 6 refills  RTC next summer\"       Refill decision: Medication refilled per protocol.                       "

## 2024-08-30 ENCOUNTER — VIRTUAL VISIT (OUTPATIENT)
Dept: PSYCHIATRY | Facility: CLINIC | Age: 24
End: 2024-08-30
Payer: COMMERCIAL

## 2024-08-30 DIAGNOSIS — F41.1 GAD (GENERALIZED ANXIETY DISORDER): ICD-10-CM

## 2024-08-30 DIAGNOSIS — F39 MOOD DISORDER (H): ICD-10-CM

## 2024-08-30 PROCEDURE — 90833 PSYTX W PT W E/M 30 MIN: CPT | Mod: 95 | Performed by: PSYCHIATRY & NEUROLOGY

## 2024-08-30 PROCEDURE — 99214 OFFICE O/P EST MOD 30 MIN: CPT | Mod: 95 | Performed by: PSYCHIATRY & NEUROLOGY

## 2024-08-30 RX ORDER — LAMOTRIGINE 100 MG/1
150 TABLET ORAL DAILY
Qty: 45 TABLET | Refills: 11 | Status: SHIPPED | OUTPATIENT
Start: 2024-08-30

## 2024-08-30 ASSESSMENT — PAIN SCALES - GENERAL: PAINLEVEL: NO PAIN (0)

## 2024-08-30 NOTE — PROGRESS NOTES
"Virtual Visit Details    Type of service:  Video Visit     Originating Location (pt. Location): Home    Distant Location (provider location):  On-site  Platform used for Video Visit: The Daily Caller      Progress Note    Identification: Keri is a 24 year old woman with a history of persistent depressive disorder (versus bipolar 2). She was last seen in November 2023 at which time we made no medication changes.    Recent History:     Delaney reports she graduated from college in December. Has been living at home since then.  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  It was difficult-- primarily patients with borderline PD and bipolar disorder. Challenging patient population. [place] a lot. Difficult but rewarding experienced.  Applied for [employer], [employer], and [employer]    Issue with zoloft 1-2 weeks ago. New insurance company wasn't accepted at [facility] at first, but now it's working.  Was off medication for about 4 days, mood worsened during that time. Helped me realize that I'm on the right medication.    Sleep has not been great. Was working evening shifts, so got to sleep very late, but hoping it will be better if she can start working day shifts.    Brief instance of being off lamotrigine, had some depression then.    Mood has been very stable as long as taking both meds regularly.    Seeing a therapist every 2 weeks. Coming to terms with having adult things. Angelina Murrell with [facility]. Going well.    Mom just moved to [place]. Was difficult when she moved.    Thinking about grad school fall 2026. [employer] - Masters in Counseling and Psychological Services.     Has made friends with co-workers. Overall reports she does't go out much, spend a lot of time at home.      Medications:  Lamotrigine 150mg/day  Zoloft 50mg/day  Birth control pill    Mental Status Examination: Patient was pleasant, calm and cooperative. Normal speech. Affect is neutral to bright. Mood is reported as \"good\". " Thought process is linear. Thought content without SI or HI currently. No evidence of psychosis. Insight and judgment are intact. Concentration and memory within normal limits. Intellectual functioning appears to be above average.    Diagnosis:  1. Persistent depressive disorder (rule out bipolar 2)  2. Generalized anxiety disorder with panic attacks  3. Rule out eating disorder  4. Rule out body dysmorphic disorder     Assessment: Keri is a 24 year old woman with history of depression and anxiety symptoms since childhood, worsening during middle school and high school. History of mood swings consistent with possible bipolar 2 diagnosis.  Current medications are beneficial and she notes that as long as she takes them consistently, her mood is well-regulated.    Plan:   1. Continue current medications. 6 refills  2. Continue therapy with Angelina Murrell  2. RTC 6 months (I will provide additional refills to avoid the situation of her running out of medication which has happened repeatedly.)    Maria Teresa Rand MD      Psychiatry Individual Psychotherapy Note   Psychotherapy start time - 1:40  Psychotherapy end time - 2:00  Date treatment plan last reviewed with patient - 8-28-24  Subjective: This supportive psychotherapy session addressed issues related to goals of therapy and current psychosocial stressors. Patient's reaction: Maintenance in the context of mental status appropriate for ambulatory setting.    Interactive complexity indicated? No  Plan: RTC in timeframe noted above  Psychotherapy services during this visit included myself and the patient.   Treatment Plan      SYMPTOMS; PROBLEMS   MEASURABLE GOALS;    FUNCTIONAL IMPROVEMENT / GAINS INTERVENTIONS DISCHARGE CRITERIA   Depression: depressed mood and low energy  Radha/Hypomania: increased energy   reduce depressive episodes, report feeling more positive about self , and reduce manic/hypomanic episodes Supportive / psychodynamic marked symptom improvement

## 2024-08-30 NOTE — NURSING NOTE
Current patient location: [address] [place] [id number]    Is the patient currently in the state of [place]? YES    Visit mode:VIDEO    If the visit is dropped, the patient can be reconnected by: VIDEO VISIT: Text to cell phone:   Telephone Information:   Mobile [phone]       Will anyone else be joining the visit? NO  (If patient encounters technical issues they should call [phone])    How would you like to obtain your AVS? MyChart    Are changes needed to the allergy or medication list? No    Are refills needed on medications prescribed by this physician? NO    Rooming Documentation:  Not applicable      Reason for visit: MERCEDEZ KING

## 2024-09-11 ENCOUNTER — OFFICE VISIT (OUTPATIENT)
Dept: FAMILY MEDICINE | Facility: CLINIC | Age: 24
End: 2024-09-11
Payer: COMMERCIAL

## 2024-09-11 VITALS
HEART RATE: 107 BPM | BODY MASS INDEX: 37.73 KG/M2 | WEIGHT: 221 LBS | SYSTOLIC BLOOD PRESSURE: 118 MMHG | OXYGEN SATURATION: 98 % | RESPIRATION RATE: 21 BRPM | DIASTOLIC BLOOD PRESSURE: 70 MMHG | HEIGHT: 64 IN | TEMPERATURE: 98 F

## 2024-09-11 DIAGNOSIS — Z11.3 SCREENING FOR STDS (SEXUALLY TRANSMITTED DISEASES): ICD-10-CM

## 2024-09-11 DIAGNOSIS — N94.819 VULVODYNIA: ICD-10-CM

## 2024-09-11 DIAGNOSIS — Z30.41 SURVEILLANCE OF PREVIOUSLY PRESCRIBED CONTRACEPTIVE PILL: ICD-10-CM

## 2024-09-11 DIAGNOSIS — J45.30 MILD PERSISTENT ASTHMA WITHOUT COMPLICATION: ICD-10-CM

## 2024-09-11 DIAGNOSIS — Z12.4 CERVICAL CANCER SCREENING: ICD-10-CM

## 2024-09-11 DIAGNOSIS — F39 MOOD DISORDER (H): ICD-10-CM

## 2024-09-11 DIAGNOSIS — Z23 ENCOUNTER FOR IMMUNIZATION: ICD-10-CM

## 2024-09-11 DIAGNOSIS — Z00.00 ROUTINE GENERAL MEDICAL EXAMINATION AT A HEALTH CARE FACILITY: Primary | ICD-10-CM

## 2024-09-11 DIAGNOSIS — Z11.4 SCREENING FOR HUMAN IMMUNODEFICIENCY VIRUS: ICD-10-CM

## 2024-09-11 LAB
ANION GAP SERPL CALCULATED.3IONS-SCNC: 8 MMOL/L (ref 7–15)
BUN SERPL-MCNC: 9.9 MG/DL (ref 6–20)
CALCIUM SERPL-MCNC: 8.5 MG/DL (ref 8.8–10.4)
CHLORIDE SERPL-SCNC: 106 MMOL/L (ref 98–107)
CREAT SERPL-MCNC: 0.68 MG/DL (ref 0.51–0.95)
EGFRCR SERPLBLD CKD-EPI 2021: >90 ML/MIN/1.73M2
ERYTHROCYTE [DISTWIDTH] IN BLOOD BY AUTOMATED COUNT: 13 % (ref 10–15)
GLUCOSE SERPL-MCNC: 85 MG/DL (ref 70–99)
HCO3 SERPL-SCNC: 26 MMOL/L (ref 22–29)
HCT VFR BLD AUTO: 39.9 % (ref 35–47)
HGB BLD-MCNC: 12.6 G/DL (ref 11.7–15.7)
HIV 1+2 AB+HIV1 P24 AG SERPL QL IA: NONREACTIVE
MCH RBC QN AUTO: 29 PG (ref 26.5–33)
MCHC RBC AUTO-ENTMCNC: 31.6 G/DL (ref 31.5–36.5)
MCV RBC AUTO: 92 FL (ref 78–100)
PLATELET # BLD AUTO: 346 10E3/UL (ref 150–450)
POTASSIUM SERPL-SCNC: 4.1 MMOL/L (ref 3.4–5.3)
RBC # BLD AUTO: 4.35 10E6/UL (ref 3.8–5.2)
SODIUM SERPL-SCNC: 140 MMOL/L (ref 135–145)
WBC # BLD AUTO: 8.9 10E3/UL (ref 4–11)

## 2024-09-11 PROCEDURE — 87389 HIV-1 AG W/HIV-1&-2 AB AG IA: CPT | Performed by: PHYSICIAN ASSISTANT

## 2024-09-11 PROCEDURE — 87491 CHLMYD TRACH DNA AMP PROBE: CPT | Performed by: PHYSICIAN ASSISTANT

## 2024-09-11 PROCEDURE — 80048 BASIC METABOLIC PNL TOTAL CA: CPT | Performed by: PHYSICIAN ASSISTANT

## 2024-09-11 PROCEDURE — 90656 IIV3 VACC NO PRSV 0.5 ML IM: CPT | Performed by: PHYSICIAN ASSISTANT

## 2024-09-11 PROCEDURE — 85027 COMPLETE CBC AUTOMATED: CPT | Performed by: PHYSICIAN ASSISTANT

## 2024-09-11 PROCEDURE — 90471 IMMUNIZATION ADMIN: CPT | Performed by: PHYSICIAN ASSISTANT

## 2024-09-11 PROCEDURE — 87591 N.GONORRHOEAE DNA AMP PROB: CPT | Performed by: PHYSICIAN ASSISTANT

## 2024-09-11 PROCEDURE — G0145 SCR C/V CYTO,THINLAYER,RESCR: HCPCS | Performed by: PHYSICIAN ASSISTANT

## 2024-09-11 PROCEDURE — 99213 OFFICE O/P EST LOW 20 MIN: CPT | Mod: 25 | Performed by: PHYSICIAN ASSISTANT

## 2024-09-11 PROCEDURE — 36415 COLL VENOUS BLD VENIPUNCTURE: CPT | Performed by: PHYSICIAN ASSISTANT

## 2024-09-11 PROCEDURE — 86780 TREPONEMA PALLIDUM: CPT | Performed by: PHYSICIAN ASSISTANT

## 2024-09-11 PROCEDURE — 99395 PREV VISIT EST AGE 18-39: CPT | Mod: 25 | Performed by: PHYSICIAN ASSISTANT

## 2024-09-11 RX ORDER — NORGESTIMATE AND ETHINYL ESTRADIOL 0.25-0.035
1 KIT ORAL DAILY
Qty: 84 TABLET | Refills: 4 | Status: SHIPPED | OUTPATIENT
Start: 2024-09-11

## 2024-09-11 RX ORDER — BECLOMETHASONE DIPROPIONATE HFA 80 UG/1
1 AEROSOL, METERED RESPIRATORY (INHALATION) 2 TIMES DAILY
Qty: 10.6 G | Refills: 11 | Status: SHIPPED | OUTPATIENT
Start: 2024-09-11

## 2024-09-11 RX ORDER — ALBUTEROL SULFATE 90 UG/1
1-2 AEROSOL, METERED RESPIRATORY (INHALATION) EVERY 4 HOURS PRN
Qty: 18 G | Refills: 11 | Status: SHIPPED | OUTPATIENT
Start: 2024-09-11

## 2024-09-11 RX ORDER — KETOCONAZOLE 20 MG/ML
SHAMPOO TOPICAL
COMMUNITY
Start: 2024-04-24

## 2024-09-11 SDOH — HEALTH STABILITY: PHYSICAL HEALTH
ON AVERAGE, HOW MANY DAYS PER WEEK DO YOU ENGAGE IN MODERATE TO STRENUOUS EXERCISE (LIKE A BRISK WALK)?: PATIENT DECLINED

## 2024-09-11 ASSESSMENT — PATIENT HEALTH QUESTIONNAIRE - PHQ9
SUM OF ALL RESPONSES TO PHQ QUESTIONS 1-9: 9
10. IF YOU CHECKED OFF ANY PROBLEMS, HOW DIFFICULT HAVE THESE PROBLEMS MADE IT FOR YOU TO DO YOUR WORK, TAKE CARE OF THINGS AT HOME, OR GET ALONG WITH OTHER PEOPLE: VERY DIFFICULT
SUM OF ALL RESPONSES TO PHQ QUESTIONS 1-9: 9

## 2024-09-11 ASSESSMENT — PAIN SCALES - GENERAL: PAINLEVEL: NO PAIN (0)

## 2024-09-11 NOTE — PATIENT INSTRUCTIONS
Patient Education   Preventive Care Advice   This is general advice given by our system to help you stay healthy. However, your care team may have specific advice just for you. Please talk to your care team about your preventive care needs.  Nutrition  Eat 5 or more servings of fruits and vegetables each day.  Try wheat bread, brown rice and whole grain pasta (instead of white bread, rice, and pasta).  Get enough calcium and vitamin D. Check the label on foods and aim for 100% of the RDA (recommended daily allowance).  Lifestyle  Exercise at least 150 minutes each week  (30 minutes a day, 5 days a week).  Do muscle strengthening activities 2 days a week. These help control your weight and prevent disease.  No smoking.  Wear sunscreen to prevent skin cancer.  Have a dental exam and cleaning every 6 months.  Yearly exams  See your health care team every year to talk about:  Any changes in your health.  Any medicines your care team has prescribed.  Preventive care, family planning, and ways to prevent chronic diseases.  Shots (vaccines)   HPV shots (up to age 26), if you've never had them before.  Hepatitis B shots (up to age 59), if you've never had them before.  COVID-19 shot: Get this shot when it's due.  Flu shot: Get a flu shot every year.  Tetanus shot: Get a tetanus shot every 10 years.  Pneumococcal, hepatitis A, and RSV shots: Ask your care team if you need these based on your risk.  Shingles shot (for age 50 and up)  General health tests  Diabetes screening:  Starting at age 35, Get screened for diabetes at least every 3 years.  If you are younger than age 35, ask your care team if you should be screened for diabetes.  Cholesterol test: At age 39, start having a cholesterol test every 5 years, or more often if advised.  Bone density scan (DEXA): At age 50, ask your care team if you should have this scan for osteoporosis (brittle bones).  Hepatitis C: Get tested at least once in your life.  STIs (sexually  transmitted infections)  Before age 24: Ask your care team if you should be screened for STIs.  After age 24: Get screened for STIs if you're at risk. You are at risk for STIs (including HIV) if:  You are sexually active with more than one person.  You don't use condoms every time.  You or a partner was diagnosed with a sexually transmitted infection.  If you are at risk for HIV, ask about PrEP medicine to prevent HIV.  Get tested for HIV at least once in your life, whether you are at risk for HIV or not.  Cancer screening tests  Cervical cancer screening: If you have a cervix, begin getting regular cervical cancer screening tests starting at age 21.  Breast cancer scan (mammogram): If you've ever had breasts, begin having regular mammograms starting at age 40. This is a scan to check for breast cancer.  Colon cancer screening: It is important to start screening for colon cancer at age 45.  Have a colonoscopy test every 10 years (or more often if you're at risk) Or, ask your provider about stool tests like a FIT test every year or Cologuard test every 3 years.  To learn more about your testing options, visit:   .  For help making a decision, visit:   https://bit.ly/hc59335.  Prostate cancer screening test: If you have a prostate, ask your care team if a prostate cancer screening test (PSA) at age 55 is right for you.  Lung cancer screening: If you are a current or former smoker ages 50 to 80, ask your care team if ongoing lung cancer screenings are right for you.  For informational purposes only. Not to replace the advice of your health care provider. Copyright   2023 [facility]. All rights reserved. Clinically reviewed by the [facility] Transitions Program. [employer] 089100 - REV 01/24.  Learning About Depression Screening  What is depression screening?  Depression screening is a way to see if you have depression symptoms. It may be done by a doctor or counselor. It's often part of a routine  "checkup. That's because your mental health is just as important as your physical health.  Depression is a mental health condition that affects how you feel, think, and act. You may:  Have less energy.  Lose interest in your daily activities.  Feel sad and grouchy for a long time.  Depression is very common. It affects people of all ages.  Many things can lead to depression. Some people become depressed after they have a stroke or find out they have a major illness like cancer or heart disease. The death of a loved one or a breakup may lead to depression. It can run in families. Most experts believe that a combination of inherited genes and stressful life events can cause it.  What happens during screening?  You may be asked to fill out a form about your depression symptoms. You and the doctor will discuss your answers. The doctor may ask you more questions to learn more about how you think, act, and feel.  What happens after screening?  If you have symptoms of depression, your doctor will talk to you about your options.  Doctors usually treat depression with medicines or counseling. Often, combining the two works best. Many people don't get help because they think that they'll get over the depression on their own. But people with depression may not get better unless they get treatment.  The cause of depression is not well understood. There may be many factors involved. But if you have depression, it's not your fault.  A serious symptom of depression is thinking about death or suicide. If you or someone you care about talks about this or about feeling hopeless, get help right away.  It's important to know that depression can be treated. Medicine, counseling, and self-care may help.  Where can you learn more?  Go to https://www.[employer].net/patiented  Enter T185 in the search box to learn more about \"Learning About Depression Screening.\"  Current as of: June 24, 2023  Content Version: 14.1 2006-2024 [employer] " Incorporated.   Care instructions adapted under license by your healthcare professional. If you have questions about a medical condition or this instruction, always ask your healthcare professional. Healthwise, Incorporated disclaims any warranty or liability for your use of this information.

## 2024-09-12 LAB
C TRACH DNA SPEC QL PROBE+SIG AMP: NEGATIVE
N GONORRHOEA DNA SPEC QL NAA+PROBE: NEGATIVE
T PALLIDUM AB SER QL: NONREACTIVE

## 2024-09-16 LAB
BKR LAB AP GYN ADEQUACY: NORMAL
BKR LAB AP GYN INTERPRETATION: NORMAL
BKR LAB AP HPV REFLEX: NO
BKR LAB AP LMP: NORMAL
BKR LAB AP PREVIOUS ABNORMAL: NORMAL
PATH REPORT.COMMENTS IMP SPEC: NORMAL
PATH REPORT.COMMENTS IMP SPEC: NORMAL
PATH REPORT.RELEVANT HX SPEC: NORMAL

## 2024-10-31 ENCOUNTER — MYC MEDICAL ADVICE (OUTPATIENT)
Dept: FAMILY MEDICINE | Facility: CLINIC | Age: 24
End: 2024-10-31
Payer: COMMERCIAL

## 2024-12-30 ENCOUNTER — HOSPITAL ENCOUNTER (EMERGENCY)
Facility: CLINIC | Age: 24
Discharge: HOME | End: 2024-12-30
Admitting: EMERGENCY MEDICINE
Payer: COMMERCIAL

## 2024-12-30 VITALS
OXYGEN SATURATION: 97 % | RESPIRATION RATE: 18 BRPM | TEMPERATURE: 101.2 F | SYSTOLIC BLOOD PRESSURE: 113 MMHG | HEART RATE: 122 BPM | DIASTOLIC BLOOD PRESSURE: 75 MMHG

## 2024-12-30 LAB
FLUAV RNA SPEC QL NAA+PROBE: POSITIVE
FLUBV RNA RESP QL NAA+PROBE: NEGATIVE
RSV RNA SPEC NAA+PROBE: NEGATIVE
SARS-COV-2 RNA RESP QL NAA+PROBE: NEGATIVE

## 2024-12-30 PROCEDURE — 99281 EMR DPT VST MAYX REQ PHY/QHP: CPT

## 2024-12-30 PROCEDURE — 87637 SARSCOV2&INF A&B&RSV AMP PRB: CPT | Performed by: EMERGENCY MEDICINE

## 2024-12-30 PROCEDURE — 250N000013 HC RX MED GY IP 250 OP 250 PS 637: Performed by: EMERGENCY MEDICINE

## 2024-12-30 RX ORDER — ACETAMINOPHEN 500 MG
1000 TABLET ORAL ONCE
Status: COMPLETED | OUTPATIENT
Start: 2024-12-30 | End: 2024-12-30

## 2024-12-30 RX ORDER — IBUPROFEN 600 MG/1
600 TABLET, FILM COATED ORAL ONCE
Status: COMPLETED | OUTPATIENT
Start: 2024-12-30 | End: 2024-12-30

## 2024-12-30 RX ADMIN — IBUPROFEN 600 MG: 600 TABLET ORAL at 19:11

## 2024-12-30 RX ADMIN — ACETAMINOPHEN 1000 MG: 500 TABLET, FILM COATED ORAL at 19:08

## 2024-12-30 ASSESSMENT — ACTIVITIES OF DAILY LIVING (ADL)
ADLS_ACUITY_SCORE: 41

## 2024-12-31 NOTE — ED TRIAGE NOTES
Pt states fever, headache, cough, brain fog, SOB since yesterday.      Triage Assessment (Adult)       Row Name 12/30/24 9978          Triage Assessment    Airway WDL WDL        Respiratory WDL    Respiratory WDL WDL        Skin Circulation/Temperature WDL    Skin Circulation/Temperature WDL WDL        Cardiac WDL    Cardiac WDL WDL        Peripheral/Neurovascular WDL    Peripheral Neurovascular WDL WDL        Cognitive/Neuro/Behavioral WDL    Cognitive/Neuro/Behavioral WDL WDL

## 2024-12-31 NOTE — ED NOTES
Patient was taken back from triage to Hallway chair 3. Patient saw mychart test results and stated that she would like to leave without being seen by a provider as they saw they were Influenza

## 2025-01-09 NOTE — PROGRESS NOTES
"DBT Progress Note    Date: 2020  Visit Type: Individual DBT  Appointment Duration: 3:09 to 3:58 (49 minutes)  On time? YES   How Late? 0 minutes    Client: Abigail \"Keri\" Clarence Tran  : 2000 (19 year old)  MRN: [id number]  Diagnosis:   Encounter Diagnoses   Name Primary?     LETICIA (generalized anxiety disorder), with panic attacks Yes     Persistent depressive disorder, current major depressive episode        Diary Card? YES  Spoken to client since last session? No     Since last session, did the client engage in any of the following behaviors? (If yes, brief description)  Life-threatening behavior:  No  Therapy-interfering behavior:  YES, no phone coaching  Quality-of-life-interfering behavior:  YES, body image issues, procrastination on school work  Hospitalizations:  No  Substance related behaviors:  No    Primary targets this session:  Therapy-interfering behavior and Quality of life interfering behaviors    Secondary targets this session:  Self-invalidation, Unrelenting crises, Inhibited emotional exp., Active passivity    Mindfulness, Emotion Regulation    Strategies used in this session:    Dialectical strategies  Validation  Behavioral analysis  Insight/Interpretation  Solution analysis    Notes/Assignments: Keri shared with excitement that she was accepted at [employer] last week, which was her first choice of schools. She described her mood as low last Thursday, and feels the news of college acceptance boosted it to \"normal\" over the last several days. Keri stated she's felt increased feelings of shame regarding her body image and school procrastination. Did a behavior chain and solution analysis regarding the latter behavior issue, and discussed using pros/cons next time she's faced with procrastination urges. Encouraged Keri to notice the emotions she feels before and during procrastination, as she's struggled to do this so far.    Suicide assessment completed? YES, Keri denied " Pre-admit phone appt completed using [place] phone line . Patient states she thinks she will be in a walking boot post op; but has a phone appt with Dr. Nava's PRebeccaARebecca tomorrow and will confirm.   suicidal ideation, plan, or intent today.               Clinician: Sandra Diaz MA, [facility]  Supervisor: Terrie Montero, PhD, LP, LMFT    I was not present for the session. I reviewed the case and the note and I agree with the plan. Terrie Montero, PHD, LP

## 2025-02-26 ENCOUNTER — MYC MEDICAL ADVICE (OUTPATIENT)
Dept: PSYCHIATRY | Facility: CLINIC | Age: 25
End: 2025-02-26
Payer: COMMERCIAL

## 2025-08-12 ENCOUNTER — PATIENT OUTREACH (OUTPATIENT)
Dept: CARE COORDINATION | Facility: CLINIC | Age: 25
End: 2025-08-12
Payer: COMMERCIAL